# Patient Record
(demographics unavailable — no encounter records)

---

## 2024-11-04 NOTE — ASSESSMENT
[FreeTextEntry1] : Impression: Infectious gastroenteritis late December/early January resolved.  Coincidental symptoms briefly in July.  Do not suspect underlying GI disorder.  Patient is of average risk for colorectal cancer should have first screening colonoscopy.  Plan: Schedule colonoscopy with Suprep.  Do EGD with biopsy to rule out celiac/H. pylori/etc.

## 2024-11-04 NOTE — HISTORY OF PRESENT ILLNESS
[FreeTextEntry1] : 50-year-old male in good health experienced 6 days of nausea vomiting diarrhea and fever late December/early January.  Went to urgent care diagnosed with viral gastroenteritis.  No stool specimen sent.  Symptoms resolved spontaneously.  Similar symptoms early July but lasted only for 2 days.  No symptoms since.  Bowel movements normal.  Never had a colonoscopy.  No family history of colon cancer or polyps.